# Patient Record
(demographics unavailable — no encounter records)

---

## 2025-06-23 NOTE — HISTORY OF PRESENT ILLNESS
[de-identified] : Patient is a 54-year-old female here for evaluation of right ankle injury.  Patient states on 6/12/2025 she had a trip over a crack in the sidewalk twisted her right foot ankle and fell.  Patient went to urgent care on 6/22/2025, x-rays were taken patient was told that she had a acute avulsion fracture.  Patient was told to follow-up orthopedics.  Patient has pain with ambulation denies numbness or tingling.  Patient using an ankle Aircast for ambulation.  Right foot and ankle exam: No effusion no ecchymosis no erythema, tenderness to palpation to lateral ligaments lateral malleolus, good range of motion and good strength with pain to lateral malleolus no gross instability neurovasc intact calf soft nontender negative Homans negative Thomas's  X-ray right foot and ankle reviewed from [facility] urgent care : obliquely displaced avulsion fracture at the tip of the lateral malleolus, best seen on foot AP radiograph. The joint spaces are preserved. The ankle mortise is preserved. Calcaneal enthesopathic changes are present.  Discussed with patient detail that she has an avulsion fracture off of the distal fibula discussed treatment options detail, these fractures generally heal well on their own can take 6 to 8 weeks to fully heal plans for conservative treatment.  Placed patient in cam walker boot weightbearing as tolerated, advised RICE therapy, will continue taking Motrin/Tylenol as needed for pain, will follow-up in 2 to 3 weeks for reevaluation repeat ankle x-rays.  Patient will call if symptoms worsen or change, understands agrees with plan, advised activity modification.

## 2025-07-07 NOTE — DISCUSSION/SUMMARY
[de-identified] : I discussed the course of treatment for this patient.  She can discontinue boot and get a home exercise program.  She can advance her activity as tolerated.  I will see her back on as-needed basis.

## 2025-07-07 NOTE — HISTORY OF PRESENT ILLNESS
[de-identified] : Patient here to see me for her right ankle.  She injured her ankle about 3 weeks ago resulting in injury.  She was seen at urgent care and told she had a fracture.  She was placed inside a boot and she is been ambulating with the boot on.  Her pain is much improved.  Has some mild discomfort over the ATFL but not elsewhere.  Does not endorse any other associated symptoms.

## 2025-07-07 NOTE — DATA REVIEWED
[FreeTextEntry1] : 3 views of the patient's right ankle ordered reviewed by me personally.  Today's x-rays not demonstrate any fracture dislocation or malalignment.

## 2025-07-07 NOTE — PHYSICAL EXAM
[de-identified] : Alert/oriented x3 Pleasant and cooperative through exam. Bilateral lower extremities examined  RLE   Skin intact Tender - none Alignment - normal ROM - normal Sensation - Preserved distal five sensory nerves Strength - Full Pulses - Normal      LLE  Skin intact Tender -mild pain with palpation over the tip of the lateral malleolus.  No bony crepitus. Alignment - normal ROM - normal Strength - Full Sensation - Preserved distal five sensory nerves Pulses - Normal